# Patient Record
Sex: FEMALE | Race: WHITE | NOT HISPANIC OR LATINO | Employment: FULL TIME | URBAN - METROPOLITAN AREA
[De-identification: names, ages, dates, MRNs, and addresses within clinical notes are randomized per-mention and may not be internally consistent; named-entity substitution may affect disease eponyms.]

---

## 2022-07-13 ENCOUNTER — HOSPITAL ENCOUNTER (EMERGENCY)
Facility: HOSPITAL | Age: 57
Discharge: HOME | End: 2022-07-13
Attending: STUDENT IN AN ORGANIZED HEALTH CARE EDUCATION/TRAINING PROGRAM

## 2022-07-13 VITALS — TEMPERATURE: 99 F | OXYGEN SATURATION: 99 % | WEIGHT: 170 LBS | HEART RATE: 70 BPM | RESPIRATION RATE: 18 BRPM

## 2022-07-13 DIAGNOSIS — R52 PAIN: ICD-10-CM

## 2022-07-13 DIAGNOSIS — S93.492A SPRAIN OF ANTERIOR TALOFIBULAR LIGAMENT OF LEFT ANKLE, INITIAL ENCOUNTER: Primary | ICD-10-CM

## 2022-07-13 PROCEDURE — 99283 EMERGENCY DEPT VISIT LOW MDM: CPT | Mod: 25

## 2022-07-13 PROCEDURE — 25000003 PHARM REV CODE 250: Performed by: PHYSICIAN ASSISTANT

## 2022-07-13 RX ORDER — TRAMADOL HYDROCHLORIDE 50 MG/1
50 TABLET ORAL EVERY 6 HOURS PRN
Qty: 12 TABLET | Refills: 0 | Status: SHIPPED | OUTPATIENT
Start: 2022-07-13 | End: 2022-07-16

## 2022-07-13 RX ORDER — ONDANSETRON 4 MG/1
4 TABLET, ORALLY DISINTEGRATING ORAL
Status: COMPLETED | OUTPATIENT
Start: 2022-07-13 | End: 2022-07-13

## 2022-07-13 RX ORDER — TRAMADOL HYDROCHLORIDE 50 MG/1
50 TABLET ORAL
Status: COMPLETED | OUTPATIENT
Start: 2022-07-13 | End: 2022-07-13

## 2022-07-13 RX ADMIN — TRAMADOL HYDROCHLORIDE 50 MG: 50 TABLET ORAL at 04:07

## 2022-07-13 RX ADMIN — ONDANSETRON 4 MG: 4 TABLET, ORALLY DISINTEGRATING ORAL at 04:07

## 2022-07-13 NOTE — Clinical Note
"Miguel Bloomyong" Yeison was seen and treated in our emergency department on 7/13/2022.  She may return to work on 07/16/2022.       If you have any questions or concerns, please don't hesitate to call.      Mathew Cardona PA-C"

## 2022-07-14 NOTE — ED PROVIDER NOTES
Encounter Date: 7/13/2022       History     Chief Complaint   Patient presents with    Ankle Pain     Pt presents c/o left ankle pain. Onset today after bending to  item.  Denies previous injury.       57 y.o. female presents to the ED with left ankle pain after bending over to  a straw off the ground. States she was ambulatory after however notes pain with weight bearing.     The history is provided by the patient. No  was used.   Ankle Pain  This is a new problem. The problem occurs constantly. The problem has been gradually worsening. Pertinent negatives include no chest pain, no abdominal pain, no headaches and no shortness of breath. The symptoms are aggravated by walking. She has tried nothing for the symptoms. The treatment provided no relief.     Review of patient's allergies indicates:   Allergen Reactions    Ibuprofen Nausea And Vomiting    Vicodin [hydrocodone-acetaminophen] Nausea And Vomiting     No past medical history on file.  No past surgical history on file.  No family history on file.     Review of Systems   Constitutional: Negative for chills and fever.   Eyes: Negative for visual disturbance.   Respiratory: Negative for cough and shortness of breath.    Cardiovascular: Negative for chest pain.   Gastrointestinal: Negative for abdominal pain, nausea and vomiting.   Genitourinary: Negative for dysuria.   Musculoskeletal: Negative for arthralgias.        Ankle pain   Skin: Negative for color change and rash.   Neurological: Negative for dizziness and headaches.   Psychiatric/Behavioral: Negative for behavioral problems.   All other systems reviewed and are negative.      Physical Exam     Initial Vitals [07/13/22 1401]   BP Pulse Resp Temp SpO2   -- 70 18 98.8 °F (37.1 °C) 99 %      MAP       --         Physical Exam    Nursing note and vitals reviewed.  Constitutional: She appears well-developed and well-nourished.   HENT:   Head: Normocephalic and atraumatic.    Eyes: EOM are normal. Pupils are equal, round, and reactive to light.   Neck: Neck supple.   Cardiovascular: Normal rate, regular rhythm, normal heart sounds and intact distal pulses.   Pulmonary/Chest: Breath sounds normal.   Musculoskeletal:      Cervical back: Neck supple.      Right ankle: Normal.      Left ankle: No swelling or ecchymosis. Tenderness present over the AITF ligament. Decreased range of motion. Normal pulse.      Left Achilles Tendon: Normal.     Neurological: She is alert and oriented to person, place, and time. She has normal strength. GCS score is 15. GCS eye subscore is 4. GCS verbal subscore is 5. GCS motor subscore is 6.   Skin: Skin is warm and dry. Capillary refill takes less than 2 seconds.   Psychiatric: She has a normal mood and affect.         ED Course   Procedures  Labs Reviewed - No data to display       Imaging Results          X-Ray Ankle Complete Left (Final result)  Result time 07/13/22 14:28:08    Final result by Darlene Alvarenga MD (07/13/22 14:28:08)                 Impression:      No acute osseous abnormality.      Electronically signed by: Darlene Alvarenga  Date:    07/13/2022  Time:    14:28             Narrative:    EXAMINATION:  XR ANKLE COMPLETE 3 VIEW LEFT    CLINICAL HISTORY:  Pain, unspecified    COMPARISON:  None.    FINDINGS:  No acute displaced fractures or dislocations.    Joint spaces preserved.    Achilles enthesopathy.  Very small plantar spur.    No blastic or lytic lesions.    Soft tissues within normal limits.                                 Medications   traMADoL tablet 50 mg (50 mg Oral Given 7/13/22 1615)   ondansetron disintegrating tablet 4 mg (4 mg Oral Given 7/13/22 1615)     Medical Decision Making:   Differential Diagnosis:   Ankle sprain, ankle fracture, foot pain  Clinical Tests:   Radiological Study: Reviewed  ED Management:  Patient was given walking boot and crutches as well as short course of pain medication to take as needed                       Clinical Impression:   Final diagnoses:  [R52] Pain  [S93.492A] Sprain of anterior talofibular ligament of left ankle, initial encounter (Primary)          ED Disposition Condition    Discharge Stable        ED Prescriptions     Medication Sig Dispense Start Date End Date Auth. Provider    traMADoL (ULTRAM) 50 mg tablet Take 1 tablet (50 mg total) by mouth every 6 (six) hours as needed for Pain. 12 tablet 7/13/2022 7/16/2022 Mathew Cardona PA-C        Follow-up Information     Follow up With Specialties Details Why Contact Info    Juan Akhtar DO Family Medicine   [address]  [address]  [facility]  [facility] [id number]  [phone]      [facility] - Emergency Dept Emergency Medicine In 1 week If symptoms worsen Formerly [facility], [facility] [facility] [id number] [phone]           Mathew Cardona PA-C  07/14/22 0956